# Patient Record
Sex: FEMALE
[De-identification: names, ages, dates, MRNs, and addresses within clinical notes are randomized per-mention and may not be internally consistent; named-entity substitution may affect disease eponyms.]

---

## 2020-12-11 ENCOUNTER — HOSPITAL ENCOUNTER (EMERGENCY)
Dept: HOSPITAL 5 - ED | Age: 20
Discharge: HOME | End: 2020-12-11
Payer: SELF-PAY

## 2020-12-11 VITALS — SYSTOLIC BLOOD PRESSURE: 123 MMHG | DIASTOLIC BLOOD PRESSURE: 64 MMHG

## 2020-12-11 DIAGNOSIS — J45.909: ICD-10-CM

## 2020-12-11 DIAGNOSIS — J02.9: Primary | ICD-10-CM

## 2020-12-11 LAB
ALBUMIN SERPL-MCNC: 3.8 G/DL (ref 3.9–5)
ALT SERPL-CCNC: 16 UNITS/L (ref 7–56)
BASOPHILS # (AUTO): 0.1 K/MM3 (ref 0–0.1)
BASOPHILS NFR BLD AUTO: 1 % (ref 0–1.8)
BUN SERPL-MCNC: 9 MG/DL (ref 7–17)
BUN/CREAT SERPL: 13 %
CALCIUM SERPL-MCNC: 9.3 MG/DL (ref 8.4–10.2)
EOSINOPHIL # BLD AUTO: 0.2 K/MM3 (ref 0–0.4)
EOSINOPHIL NFR BLD AUTO: 1.6 % (ref 0–4.3)
HCT VFR BLD CALC: 34.2 % (ref 30.3–42.9)
HEMOLYSIS INDEX: 6
HGB BLD-MCNC: 11 GM/DL (ref 10.1–14.3)
LYMPHOCYTES # BLD AUTO: 2.4 K/MM3 (ref 1.2–5.4)
LYMPHOCYTES NFR BLD AUTO: 16.7 % (ref 13.4–35)
MCHC RBC AUTO-ENTMCNC: 32 % (ref 30–34)
MCV RBC AUTO: 83 FL (ref 79–97)
MONOCYTES # (AUTO): 1.5 K/MM3 (ref 0–0.8)
MONOCYTES % (AUTO): 10.2 % (ref 0–7.3)
PLATELET # BLD: 369 K/MM3 (ref 140–440)
RBC # BLD AUTO: 4.12 M/MM3 (ref 3.65–5.03)

## 2020-12-11 PROCEDURE — 96365 THER/PROPH/DIAG IV INF INIT: CPT

## 2020-12-11 PROCEDURE — 87430 STREP A AG IA: CPT

## 2020-12-11 PROCEDURE — 80053 COMPREHEN METABOLIC PANEL: CPT

## 2020-12-11 PROCEDURE — 36415 COLL VENOUS BLD VENIPUNCTURE: CPT

## 2020-12-11 PROCEDURE — 70491 CT SOFT TISSUE NECK W/DYE: CPT

## 2020-12-11 PROCEDURE — 85025 COMPLETE CBC W/AUTO DIFF WBC: CPT

## 2020-12-11 PROCEDURE — 96375 TX/PRO/DX INJ NEW DRUG ADDON: CPT

## 2020-12-11 PROCEDURE — 99284 EMERGENCY DEPT VISIT MOD MDM: CPT

## 2020-12-11 PROCEDURE — 87116 MYCOBACTERIA CULTURE: CPT

## 2020-12-11 NOTE — EVENT NOTE
ED Screening Note


Date of service: 12/11/20


Time: 12:48


ED Screening Note: 


20-year-old -American female presents to the emergency room for 4-day 

history of sore throat difficulty swallowing muffled voice increased secretions.

 Denies any fever.





This initial assessment/diagnostic orders/clinical plan/treatment(s) is/are 

subject to change based on patients health status, clinical progression and re-

assessment by fellow clinical providers in the ED. Further treatment and workup 

at subsequent clinical providers discretion. Patient/guardian urged not to elope

from the ED as their condition may be serious if not clinically assessed and 

managed. 





Initial orders include:

## 2020-12-11 NOTE — EMERGENCY DEPARTMENT REPORT
ED General Adult HPI





- General


Chief complaint: Sore Throat


Stated complaint: SORE THROAT


Time Seen by Provider: 20 12:51


Source: patient


Mode of arrival: Ambulatory


Limitations: No Limitations





- History of Present Illness


Initial comments: 





Patient is a 20-year-old female with no past medical history who presents to the

emergency department for evaluation of worsening sore throat for the past 4 

days.  Patient denies fever, denies cough, complains of painful swallowing but 

remains able to swallow food and drink.  Patient denies abdominal pain, denies 

nausea vomiting diarrhea, denies neck pain or stiffness.





- Related Data


                                  Previous Rx's











 Medication  Instructions  Recorded  Last Taken  Type


 


Amoxicillin/Potassium Clav 1 each PO Q12HR #20 tablet 20 Unknown Rx





[Augmentin 875-125 Tablet]    


 


dexAMETHasone [Dexamethasone] 10 mg PO ONCE 1 Days #5 tablet 20 Unknown Rx











                                    Allergies











Allergy/AdvReac Type Severity Reaction Status Date / Time


 


No Known Allergies Allergy   Unverified 20 12:32














ED Review of Systems


ROS: 


Stated complaint: SORE THROAT


Other details as noted in HPI





Comment: All other systems reviewed and negative





ED Past Medical Hx





- Past Medical History


Previous Medical History?: Yes


Hx Asthma: Yes





- Medications


Home Medications: 


                                Home Medications











 Medication  Instructions  Recorded  Confirmed  Last Taken  Type


 


Amoxicillin/Potassium Clav 1 each PO Q12HR #20 tablet 20  Unknown Rx





[Augmentin 875-125 Tablet]     


 


dexAMETHasone [Dexamethasone] 10 mg PO ONCE 1 Days #5 tablet 20  Unknown 

Rx














ED Physical Exam





- General


Limitations: No Limitations


General appearance: alert, in no apparent distress





- Head


Head exam: Present: atraumatic, normocephalic





- Eye


Eye exam: Present: normal appearance





- Expanded ENT Exam


  ** Expanded


Mouth exam: Present: muffled voice


Throat exam: Positive: tonsillar erythema, tonsillar exudate, R peritonsillar 

mass





- Neck


Neck exam: Present: tenderness, lymphadenopathy





- Respiratory


Respiratory exam: Present: normal lung sounds bilaterally.  Absent: respiratory 

distress





- Cardiovascular


Cardiovascular Exam: Present: regular rate, normal rhythm.  Absent: systolic 

murmur, diastolic murmur, rubs, gallop





- GI/Abdominal


GI/Abdominal exam: Present: soft, normal bowel sounds





- Extremities Exam


Extremities exam: Present: normal inspection





- Back Exam


Back exam: Present: normal inspection





- Neurological Exam


Neurological exam: Present: alert, oriented X3





- Psychiatric


Psychiatric exam: Present: normal affect, normal mood





- Skin


Skin exam: Present: warm, dry, intact, normal color.  Absent: rash





ED Course


                                   Vital Signs











  20





  12:28 12:40 12:46


 


Temperature 98.4 F  


 


Pulse Rate 108 H 110 H 109 H


 


Respiratory 20 33 H 15





Rate   


 


Blood Pressure 137/73  


 


Blood Pressure   





[Left]   


 


O2 Sat by Pulse 97 98 99





Oximetry   














  20





  13:00 13:15 13:20


 


Temperature   


 


Pulse Rate 99 H 96 H 


 


Respiratory 12 10 L 15





Rate   


 


Blood Pressure 131/86 126/76 


 


Blood Pressure   





[Left]   


 


O2 Sat by Pulse 98 100 99





Oximetry   














  20





  13:30 13:32 13:45


 


Temperature   


 


Pulse Rate 102 H 98 H 99 H


 


Respiratory 15 15 14





Rate   


 


Blood Pressure 126/80  123/72


 


Blood Pressure  126/76 





[Left]   


 


O2 Sat by Pulse 100 99 99





Oximetry   














  20





  14:00 14:15 14:30


 


Temperature   


 


Pulse Rate 103 H 102 H 95 H


 


Respiratory 17 14 18





Rate   


 


Blood Pressure 113/66 118/72 120/60


 


Blood Pressure   





[Left]   


 


O2 Sat by Pulse 99 99 100





Oximetry   














  20





  14:45 15:09 15:15


 


Temperature   


 


Pulse Rate 94 H 117 H 106 H


 


Respiratory 16  18





Rate   


 


Blood Pressure 113/58 120/60 120/60


 


Blood Pressure   





[Left]   


 


O2 Sat by Pulse 98  100





Oximetry   














  20





  15:31 15:45 16:01


 


Temperature   


 


Pulse Rate 99 H 100 H 97 H


 


Respiratory 14 16 12





Rate   


 


Blood Pressure 113/58 113/58 113/58


 


Blood Pressure   





[Left]   


 


O2 Sat by Pulse 100 99 100





Oximetry   














  20





  16:15 16:23


 


Temperature  


 


Pulse Rate 99 H 104 H


 


Respiratory 21 19





Rate  


 


Blood Pressure 114/69 


 


Blood Pressure  123/64





[Left]  


 


O2 Sat by Pulse 99 98





Oximetry  














- Reevaluation(s)


Reevaluation #1: 





12/15/20 06:28


Discussed results at length with patient who has phelgmon vs. possible early 

abscess to left. Informed patient while discharge may be indicated if worsening,

 currently not enough clinical or radiographic evidence for intervention. 

Advised f/u with PMd or ER in 24-48 hours without fail or return to the ER for 

worsening symptoms. On discharge patient in NAD, nontoxic appearing, neck 

swelling/discomfort has significantly improved, patient's phonation normal. 





ED Medical Decision Making





- Lab Data


Result diagrams: 


                                 20 12:48





                                 20 12:48





- Radiology Data





Findings


[facility] [address] [place] [address] Cat

 Scan Report Signed Patient: SANTIAGO YIP MR#: [id number] : 

2000 Acct:[id number] Age/Sex: 20 / F ADM Date: 20 Loc: ED 

Attending Dr: Ordering Physician: VAN JOHNSNO Date of Service: 

20 Procedure(s): CT neck w con Accession Number(s): [id number] cc: VAN TYLER CT neck w con INDICATION / CLINICAL INFORMATION: 20 years Female; 

MAIN. TECHNIQUE: Contiguous thin cut axial images obtained through the neck 

following IV contrast. Sagittal and coronal reconstructions performed by the 

technologist. All CT scans at this location are performed using CT dose 

reduction for ALARA by means of automated exposure control. COMPARISON: None 

available. FINDINGS: Low density area seen in the anterior palatine tonsillar 

region on the left, worrisome for phlegmon/developing abscess. Incomplete, vague

 wall enhancement noted at this time. This finding measures approximately 2 cm 

in maximum dimension. Parapharyngeal and retropharyngeal spaces are grossly 

normal. Prominent soft tissues seen in the roof the nasopharynx, as well as 

lingual tonsillar region-felt to be reactive. MUCOSAL SPACE: Otherwise, the 

nasopharynx, oropharynx and vallecula, oral cavity and floor of mouth, 

hypopharynx, and larynx are grossly normal. LYMPH NODES: Prominent level 2 lymph

 nodes seen. No suppurative node identified. SALIVARY GLANDS: Parotid, 

submandibular, and visualized sublingual glands are within normal limits. 

THYROID GLAND: Unremarkable. PARANASAL SINUSES: Right maxillary sinus is 

opacified and slightly expanded - mucocele is suspected. Mild mucosal thickening

 seen in the ethmoids. Frontal sinuses are underdeveloped. SPINE: No significant

 abnormality of the cervical spine appreciated. VASCULAR STRUCTURES: Vascular 

structures are grossly normal in appearance. ADDITIONAL FINDINGS: Surrounding 

soft tissues are otherwise grossly normal. IMPRESSION: 1. Phlegmon/early abscess

 suggested in the left palatine tonsillar region. 2. Probable mucocele in the 

right maxillary antrum. Signer Name: Audi Giron MD, III Signed: 2020 

3:35 PM Workstation Name: [email or web address] Transcribed By: HR Dictated By: Audi Giron MD Electronically Authenticated By: Audi Giron MD Signed 

Date/Time: 20 [id number] 


Critical care attestation.: 


If time is entered above; I have spent that time in minutes in the direct care 

of this critically ill patient, excluding procedure time.








ED Disposition


Clinical Impression: 


 Peritonsillar abscess





Disposition: DC-01 TO HOME OR SELFCARE


Is pt being admited?: No


Condition: Stable


Instructions:  Peritonsillar Abscess


Additional Instructions: 


Follow-up with primary care doctor in 1 to 2 days for reevaluation.  Return to 

the emergency department for any worsening symptoms.


Prescriptions: 


Amoxicillin/Potassium Clav [Augmentin 875-125 Tablet] 1 each PO Q12HR #20 tablet


dexAMETHasone [Dexamethasone] 10 mg PO ONCE 1 Days #5 tablet


Referrals: 


PRIMARY CARE,MD [Primary Care Provider] - 3-5 Days

## 2020-12-11 NOTE — CAT SCAN REPORT
CT neck w con



INDICATION / CLINICAL INFORMATION:

20 years Female; MAIN.



TECHNIQUE:

Contiguous thin cut axial images obtained through the neck following IV contrast. Sagittal and corona
l reconstructions performed by the technologist. All CT scans at this location are performed using CT
 dose reduction for ALARA by means of automated exposure control. 



COMPARISON:

None available.



FINDINGS: Low density area seen in the anterior palatine tonsillar region on the left, worrisome for 
phlegmon/developing abscess. Incomplete, vague wall enhancement noted at this time. This finding dhruv
ures approximately 2 cm in maximum dimension.



Parapharyngeal and retropharyngeal spaces are grossly normal.



Prominent soft tissues seen in the roof the nasopharynx, as well as lingual tonsillar region-felt to 
be reactive.



MUCOSAL SPACE: Otherwise, the nasopharynx, oropharynx and vallecula, oral cavity and floor of mouth, 
hypopharynx, and larynx are grossly normal.



LYMPH NODES: Prominent level 2 lymph nodes seen. No suppurative node identified.



SALIVARY GLANDS: Parotid, submandibular, and visualized sublingual glands are within normal limits. 



THYROID GLAND: Unremarkable.



PARANASAL SINUSES: Right maxillary sinus is opacified and slightly expanded - mucocele is suspected. 
Mild mucosal thickening seen in the ethmoids. Frontal sinuses are underdeveloped.



SPINE: No significant abnormality of the cervical spine appreciated.



VASCULAR STRUCTURES: Vascular structures are grossly normal in appearance.



ADDITIONAL FINDINGS: Surrounding soft tissues are otherwise grossly normal.



IMPRESSION:

1. Phlegmon/early abscess suggested in the left palatine tonsillar region.

2. Probable mucocele in the right maxillary antrum.  



Signer Name: Audi Giron MD, III 

Signed: 12/11/2020 3:35 PM

Workstation Name: [email or web address]

## 2021-01-11 ENCOUNTER — HOSPITAL ENCOUNTER (EMERGENCY)
Dept: HOSPITAL 5 - ED | Age: 21
LOS: 1 days | Discharge: HOME | End: 2021-01-12
Payer: SELF-PAY

## 2021-01-11 VITALS — DIASTOLIC BLOOD PRESSURE: 106 MMHG | SYSTOLIC BLOOD PRESSURE: 123 MMHG

## 2021-01-11 DIAGNOSIS — S82.301A: Primary | ICD-10-CM

## 2021-01-11 DIAGNOSIS — E66.9: ICD-10-CM
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
## 2021-01-11 DIAGNOSIS — Z79.899: ICD-10-CM
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
## 2021-01-11 DIAGNOSIS — S93.601A: ICD-10-CM

## 2021-01-11 DIAGNOSIS — J45.909: ICD-10-CM

## 2021-01-11 NOTE — XRAY REPORT
RIGHT ANKLE 3 VIEWS



INDICATION / CLINICAL INFORMATION:

Pain - fall.



COMPARISON:

None available.



FINDINGS:

There is a nondisplaced fracture involving the posterior tibia. I suspect this does extend into the t
ibiotalar joint. Alignment remains normal. Ankle mortise is maintained. Distal fibula is intact. Mode
rate soft tissue swelling is noted laterally.



IMPRESSION: Nondisplaced fracture involving the distal posterior tibia with probable intra-articular 
extension.



Signer Name: Sofía Garza MD 

Signed: 1/11/2021 10:54 PM

Workstation Name: [id number]

## 2021-01-11 NOTE — EMERGENCY DEPARTMENT REPORT
ED Fall HPI





- General


Chief Complaint: Extremity Injury, Lower


Stated Complaint: ANKLE INJURY/PAIN


Source: patient


Mode of arrival: Wheelchair





- History of Present Illness


Initial Comments: 





Patient is a nulliparous 20-year-old -American female with a history of 

obesity and asthma who presents to the ED with complaint of acute onset 

persistent painful swollen right ankle after she slipped, twisted her right 

ankle and fell down about 3 hours ago.  Patient states that she is unable to 

bear weight on the right ankle because of severe pain and swelling.  Patient 

denies head or neck injuries, nausea, vomiting, loss of consciousness, seizures,

syncope, back pain, chest pain, numbness and tingling or weakness of lower 

extremities bilaterally, dizziness or lightheadedness.


MD Complaint: fall, other (Right ankle pain and swelling)


-: Sudden, hour(s) (3)


Fall From: standing, other (Slipped and twisted her right ankle and fell)


When Fall Occurred: 1-3 hours PTA


Fall Witnessed: yes, by family


Place Fall Occurred: street


Loss of Consciousness: none


Prolonged Down Time?: no


Symptoms Prior to Fall: none, other (Slipped, twisted her right ankle and fell)


Location - Extremities: Right: Ankle (Pain, swelling)


Severity: severe


Severity scale (0 -10): 9


Quality: sharp, aching


Context: tripped/slipped


Associated Symptoms: denies.  denies: headache, neck pain, numbness, weakness, 

chest paint, shortness of breath, abdominal pain, hematuria, unable to walk, 

lightheaded, vertigo, confusion, other





- Related Data


                                  Previous Rx's











 Medication  Instructions  Recorded  Last Taken  Type


 


Amoxicillin/Potassium Clav 1 each PO Q12HR #20 tablet 20 Unknown Rx





[Augmentin 875-125 Tablet]    


 


dexAMETHasone [Dexamethasone] 10 mg PO ONCE 1 Days #5 tablet 20 Unknown Rx


 


Cyclobenzaprine [Flexeril] 10 mg PO Q8H PRN #21 tablet 21 Unknown Rx


 


HYDROcodone/APAP 5-325 [[place] 1 each PO Q6HR PRN #12 tablet 21 Unknown Rx





5/325]    


 


Ibuprofen [Motrin] 800 mg PO Q8HR PRN #30 tablet 21 Unknown Rx











                                    Allergies











Allergy/AdvReac Type Severity Reaction Status Date / Time


 


No Known Allergies Allergy   Verified 01/11/21 20:28














ED Review of Systems


ROS: 


Stated complaint: ANKLE INJURY/PAIN


Other details as noted in HPI





Constitutional: denies: chills, fever


Eyes: denies: eye pain, eye discharge, vision change


ENT: denies: ear pain, throat pain


Respiratory: denies: cough, shortness of breath, wheezing


Cardiovascular: denies: chest pain, palpitations


Endocrine: no symptoms reported


Gastrointestinal: denies: abdominal pain, nausea, diarrhea


Genitourinary: denies: urgency, dysuria, discharge


Musculoskeletal: joint swelling (Right ankle swelling and pain), arthralgia (R

ight ankle pain and swelling), myalgia.  denies: back pain


Skin: denies: rash, lesions


Neurological: denies: headache, weakness, paresthesias


Psychiatric: denies: anxiety, depression


Hematological/Lymphatic: denies: easy bleeding, easy bruising





ED Past Medical Hx





- Past Medical History


Previous Medical History?: Yes


Hx Asthma: Yes





- Surgical History


Past Surgical History?: No





- Social History


Smoking Status: Never Smoker


Substance Use Type: None





- Medications


Home Medications: 


                                Home Medications











 Medication  Instructions  Recorded  Confirmed  Last Taken  Type


 


Amoxicillin/Potassium Clav 1 each PO Q12HR #20 tablet 20  Unknown Rx





[Augmentin 875-125 Tablet]     


 


dexAMETHasone [Dexamethasone] 10 mg PO ONCE 1 Days #5 tablet 20  Unknown 

Rx


 


Cyclobenzaprine [Flexeril] 10 mg PO Q8H PRN #21 tablet 21  Unknown Rx


 


HYDROcodone/APAP 5-325 [[place] 1 each PO Q6HR PRN #12 tablet 21  Unknown Rx





5/325]     


 


Ibuprofen [Motrin] 800 mg PO Q8HR PRN #30 tablet 21  Unknown Rx














ED Physical Exam





- General


Limitations: No Limitations


General appearance: alert, in no apparent distress





- Head


Head exam: Present: atraumatic, normocephalic, normal inspection





- Eye


Eye exam: Present: normal appearance, PERRL, EOMI


Pupils: Present: normal accommodation





- ENT


ENT exam: Present: normal exam, normal orophraynx, mucous membranes moist, TM's 

normal bilaterally, normal external ear exam





- Neck


Neck exam: Present: normal inspection, full ROM.  Absent: tenderness





- Respiratory


Respiratory exam: Present: normal lung sounds bilaterally.  Absent: respiratory 

distress, wheezes, rales, rhonchi, chest wall tenderness, accessory muscle use, 

prolonged expiratory





- Cardiovascular


Cardiovascular Exam: Present: regular rate, normal rhythm, normal heart sounds. 

 Absent: systolic murmur, diastolic murmur, rubs, gallop





- GI/Abdominal


GI/Abdominal exam: Present: soft, normal bowel sounds.  Absent: tenderness, 

guarding, rebound, hyperactive bowel sounds, hypoactive bowel sounds, 

organomegaly





- Extremities Exam


Extremities exam: Present: normal inspection, tenderness (Palpable right ankle 

tenderness and swelling with limited range of motion due to pain), normal 

capillary refill, joint swelling (Swollen severely tender right ankle with 

limited range of motion due to pain).  Absent: full ROM (Right ankle limited 

range of motion due to pain)





- Back Exam


Back exam: Present: normal inspection, full ROM.  Absent: tenderness, CVA 

tenderness (R), CVA tenderness (L), muscle spasm, paraspinal tenderness, 

vertebral tenderness





- Neurological Exam


Neurological exam: Present: alert, oriented X3, CN II-XII intact, normal gait, 

reflexes normal





- Psychiatric


Psychiatric exam: Present: normal affect, normal mood





- Skin


Skin exam: Present: warm, dry, intact, normal color.  Absent: rash





ED Course





                                   Vital Signs











  21





  20:27


 


Temperature 98.0 F


 


Pulse Rate 100 H


 


Respiratory 20





Rate 


 


Blood Pressure 123/106


 


O2 Sat by Pulse 100





Oximetry 














ED Medical Decision Making





- Radiology Data


Radiology results: report reviewed, image reviewed





Findings





[address] [address] 





XRay Report 


Signed 





 Patient: SANTIAGO YIP 


 MR#: [id number] 


 : 2000 Acct:[id number] 





 Age/Sex: 20 / F ADM Date: 21 





 Loc: ED 


 Attending Dr: 








 Ordering Physician: VAN BIRD 


 Date of Service: 21 


 Procedure(s): XR ankle 3+V RT 


 Accession Number(s): [id number] 





 cc: VAN BIRD 





 Fluoro Time In Minutes: 





 RIGHT ANKLE 3 VIEWS 





INDICATION / CLINICAL INFORMATION: 


Pain - fall. 





COMPARISON: 


None available. 





FINDINGS: 


There is a nondisplaced fracture involving the posterior tibia. I suspect this 

does extend into the


 tibiotalar joint. Alignment remains normal. Ankle mortise is maintained. Distal

 fibula is intact. 


 Moderate soft tissue swelling is noted laterally. 





IMPRESSION: Nondisplaced fracture involving the distal posterior tibia with 

probable intra-


articular extension. 





Signer Name: Sofía Garza MD 


Signed: 2021 10:54 PM 


Workstation Name: [email or web address] 








 Transcribed By:  


 Dictated By: Sofía Garza MD 


 Electronically Authenticated By: Sofía Garza MD 


 Signed Date/Time: 21 











 DD/DT: 21 


 TD/TT: 





- Medical Decision Making





This is a nulliparous 20-year-old -American female with a history of 

obesity and asthma who presents to the ED with complaint of acute onset 

persistent painful swollen right ankle after she slipped, twisted her right 

ankle and fell down about 3 hours ago.  Patient states that she is unable to 

bear weight on the right ankle because of severe pain and swelling.  In the ED, 

patient is alert and oriented x3 and is not in distress.  Patient was treated 

for pain in the ED and right ankle x-ray showed a nondisplaced fracture 

involving the distal posterior tibia with probable intra-articular extension.  

Patient case was discussed with the ED attending physician Dr. Jordan who advised 

that the patient's right ankle be splinted and the patient be discharged home on

 pain medications and given a referral to the orthopedic surgeon Dr. Baig for 

follow-up.  Patient was therefore treated for pain and right ankle fracture was 

splinted with posterior long-leg splint.  On reevaluation, patient is 

neurovascularly intact following the splint application.  Patient was therefore 

discharged home on pain medication and given a referral to the orthopedic 

surgeon Dr. Baig for follow-up.  Patient is advised to contact Jovanni's office

 first thing in the morning on 2021 to schedule a follow-up 

appointment.  Patient was advised return to the ED immediately if symptoms get 

worse.








- Differential Diagnosis


ankle fracture; tibial fracture; foot sprain; ankle sprain


Critical care attestation.: 


If time is entered above; I have spent that time in minutes in the direct care 

of this critically ill patient, excluding procedure time.








ED Disposition


Clinical Impression: 


 Nondisplaced fracture of distal end of right tibia





Right foot sprain


Qualifiers:


 Encounter type: initial encounter Qualified Code(s): S93.601A - Unspecified 

sprain of right foot, initial encounter





Disposition:  TO HOME OR SELFCARE


Is pt being admited?: No


Does the pt Need Aspirin: No


Condition: Stable


Instructions:  Tibial Fracture, Adult, Easy-to-Read, Ankle Fracture


Additional Instructions: 


Take medication with food, drink plenty of fluids and follow-up with orthopedic 

surgeon Dr. Baig in 24 to 48 hours for reevaluation.  Contact Dr. Baig's 

office first thing in the morning to schedule a follow-up appointment.  Return 

to the ED immediately if symptoms get worse.


Prescriptions: 


Cyclobenzaprine [Flexeril] 10 mg PO Q8H PRN #21 tablet


 PRN Reason: Muscle Spasm


Ibuprofen [Motrin] 800 mg PO Q8HR PRN #30 tablet


 PRN Reason: Pain , Severe (7-10)


HYDROcodone/APAP 5-325 [[place] 5/325] 1 each PO Q6HR PRN #12 tablet


 PRN Reason: Pain


Referrals: 


JUSTNI BAIG MD [Staff Physician] - 24 Hours


Forms:  Work/School Release Form(ED)


Time of Disposition: 23:51


Print Language: ENGLISH